# Patient Record
Sex: FEMALE | Race: WHITE | NOT HISPANIC OR LATINO | URBAN - METROPOLITAN AREA
[De-identification: names, ages, dates, MRNs, and addresses within clinical notes are randomized per-mention and may not be internally consistent; named-entity substitution may affect disease eponyms.]

---

## 2024-07-05 ENCOUNTER — OFFICE VISIT (OUTPATIENT)
Dept: OBGYN CLINIC | Facility: CLINIC | Age: 34
End: 2024-07-05
Payer: COMMERCIAL

## 2024-07-05 VITALS
BODY MASS INDEX: 23.74 KG/M2 | DIASTOLIC BLOOD PRESSURE: 74 MMHG | SYSTOLIC BLOOD PRESSURE: 130 MMHG | HEIGHT: 63 IN | WEIGHT: 134 LBS

## 2024-07-05 DIAGNOSIS — N63.13 BREAST LUMP ON RIGHT SIDE AT 8 O'CLOCK POSITION: Primary | ICD-10-CM

## 2024-07-05 PROCEDURE — 99203 OFFICE O/P NEW LOW 30 MIN: CPT | Performed by: STUDENT IN AN ORGANIZED HEALTH CARE EDUCATION/TRAINING PROGRAM

## 2024-07-05 RX ORDER — SACCHAROMYCES BOULARDII 250 MG
250 CAPSULE ORAL 2 TIMES DAILY
COMMUNITY

## 2024-07-05 NOTE — ASSESSMENT & PLAN NOTE
- Suspect benign process such as fibroadenoma. Given palpable lump, diagnostic imaging is recommended for further evaluation. Orders provided today.   - Return to office for routine care.

## 2024-07-05 NOTE — PROGRESS NOTES
[facility] [address], [address], [place] [address]    Assessment/Plan:  1. Breast lump on right side at 8 o'clock position  Assessment & Plan:  - Suspect benign process such as fibroadenoma. Given palpable lump, diagnostic imaging is recommended for further evaluation. Orders provided today.   - Return to office for routine care.   Orders:  -     Mammo diagnostic right w 3d & cad; Future  -     US breast right limited (diagnostic); Future; Expected date: 2024      Subjective:   Katelyn Marie Brunner is a 33 y.o.  presenting for evaluation of right breast lump. She is a new patient.   CC:   Chief Complaint   Patient presents with    Gynecology Problem     Lump right breast       HPI: Patient presents for evaluation of a right breast lump, which she noticed this past weekend. She reports associated right breast pain. Denies nipple discharge or dimpling.     ROS: Negative except as noted in HPI    The following portions of the patient's history were reviewed and updated as appropriate:   Past Medical History:   Diagnosis Date    Atrial tachycardia      History reviewed. No pertinent surgical history.  History reviewed. No pertinent family history.  Social History     Socioeconomic History    Marital status: /Civil Union     Spouse name: None    Number of children: None    Years of education: None    Highest education level: None   Occupational History    None   Tobacco Use    Smoking status: Never    Smokeless tobacco: Never   Substance and Sexual Activity    Alcohol use: Yes     Comment: social    Drug use: Never    Sexual activity: Yes     Partners: Male     Birth control/protection: Condom   Other Topics Concern    None   Social History Narrative    None     Social Determinants of Health     Financial Resource Strain: Not on file   Food Insecurity: Not on file   Transportation Needs: Not on file   Physical Activity: Not on file   Stress: Not on file   Social  "Connections: Not on file   Intimate Partner Violence: Not on file   Housing Stability: Not on file     Outpatient Medications Marked as Taking for the 7/5/24 encounter (Office Visit) with Vik Galvan MD   Medication    saccharomyces boulardii (FLORASTOR) 250 mg capsule     No Known Allergies        Objective:  /74 (BP Location: Left arm, Patient Position: Sitting, Cuff Size: Standard)   Ht 5' 3\" (1.6 m)   Wt 60.8 kg (134 lb)   LMP 06/14/2024   BMI 23.74 kg/m²        Chaperone present? Yes: Carol Coffman MA.    General Appearance: alert and oriented, in no acute distress.   Breast Exam:   Left: No dimpling, nipple retraction or discharge. No lumps or masses. No axillary or supraclavicular nodes.  Right: Approximately 6-7 mm firm, mobile lump at 8 o'clock position approximately 2 cm from the nipple. No dimpling, nipple retraction or discharge. No axillary or supraclavicular nodes.   Extremities: Normal range of motion.   Skin: normal, no rash or abnormalities  Neurologic: alert, oriented x3  Psychiatric: Appropriate affect, mood stable, cooperative with exam.        Vik Galvan MD  7/5/2024 1:22 PM  "

## 2024-08-01 ENCOUNTER — ANNUAL EXAM (OUTPATIENT)
Dept: OBGYN CLINIC | Facility: CLINIC | Age: 34
End: 2024-08-01
Payer: COMMERCIAL

## 2024-08-01 VITALS
WEIGHT: 133.6 LBS | SYSTOLIC BLOOD PRESSURE: 126 MMHG | BODY MASS INDEX: 23.67 KG/M2 | DIASTOLIC BLOOD PRESSURE: 76 MMHG | HEIGHT: 63 IN

## 2024-08-01 DIAGNOSIS — N63.13 BREAST LUMP ON RIGHT SIDE AT 8 O'CLOCK POSITION: ICD-10-CM

## 2024-08-01 DIAGNOSIS — Z01.411 ENCOUNTER FOR GYNECOLOGICAL EXAMINATION WITH ABNORMAL FINDING: Primary | ICD-10-CM

## 2024-08-01 DIAGNOSIS — Z12.4 SCREENING FOR CERVICAL CANCER: ICD-10-CM

## 2024-08-01 PROBLEM — Z01.419 ENCOUNTER FOR GYNECOLOGICAL EXAMINATION WITHOUT ABNORMAL FINDING: Status: ACTIVE | Noted: 2024-08-01

## 2024-08-01 PROCEDURE — 99395 PREV VISIT EST AGE 18-39: CPT | Performed by: OBSTETRICS & GYNECOLOGY

## 2024-08-01 NOTE — PROGRESS NOTES
[facility] - [address], [address], [place] [address]    ASSESSMENT/PLAN: Katelyn Marie Brunner is a 33 y.o.  who presents for annual gynecologic exam.    Encounter for routine gynecologic examination  - Routine well woman exam completed today.  - HPV Vaccination status: Immunization series complete  -mammogram  2024  calcifications for  bx    - STI screening offered including HIV testing: Declined  - Contraceptive counseling discussed.  Current contraception: condoms:     Additional problems addressed during this visit:  1. Encounter for gynecological examination with abnormal finding  2. Breast lump on right side at 8 o'clock position  Comments:  Pt w breast calcifications for sterotatic bx on     at  [facility].    extensive dw and review of pprocedure w  fu  TC consult w Dr Grove  3. Screening for cervical cancer  -     IGP, Aptima HPV, Rfx 16/18,45      CC:  Annual Gynecologic Examination    HPI: Katelyn Marie Brunner is a 33 y.o.  who presents for annual gynecologic examination.  34 yo here for wellness exam.    + has  biopsy  of  calcifications in palpable area of  breast scheduled at   for  the   .  Stereotatic.  + friend  in her  30's w  ductal breast cancer.  Mammogram  w dense tissue.    Would like to conceive  in the next  year.  Anxious .  + great midwife birth  at  [facility] 2 years ago.        The following portions of the patient's history were reviewed and updated as appropriate: She  has a past medical history of Atrial tachycardia.  She  has no past surgical history on file.  Her family history is not on file.  She  reports that she has never smoked. She has never been exposed to tobacco smoke. She has never used smokeless tobacco. She reports current alcohol use. She reports that she does not use drugs.  Current Outpatient Medications   Medication Sig Dispense Refill   • saccharomyces boulardii (FLORASTOR) 250 mg capsule Take 250 mg by mouth 2 (two) times  "a day       No current facility-administered medications for this visit.     She has No Known Allergies..    Review of Systems   Constitutional:  Negative for activity change, appetite change, chills and fever.   HENT:  Negative for ear pain and sore throat.    Eyes:  Negative for pain and visual disturbance.   Respiratory:  Negative for cough and shortness of breath.    Cardiovascular:  Negative for chest pain and palpitations.   Gastrointestinal:  Negative for abdominal pain and vomiting.   Endocrine: Negative.    Genitourinary: Negative.  Negative for dysuria and hematuria.        Breast lump   left breast   prior   breast fed son ,  calcifications for  bx    Musculoskeletal:  Negative for arthralgias and back pain.   Skin:  Negative for color change and rash.   Allergic/Immunologic: Negative.    Neurological:  Negative for seizures and syncope.   Hematological: Negative.    Psychiatric/Behavioral: Negative.     All other systems reviewed and are negative.        Objective:  /76 (BP Location: Left arm, Patient Position: Sitting, Cuff Size: Standard)   Ht 5' 3\" (1.6 m)   Wt 60.6 kg (133 lb 9.6 oz)   LMP 07/09/2024   BMI 23.67 kg/m²    Physical Exam  Vitals and nursing note reviewed.   Constitutional:       Appearance: Normal appearance.   HENT:      Head: Normocephalic.   Cardiovascular:      Rate and Rhythm: Normal rate and regular rhythm.      Pulses: Normal pulses.      Heart sounds: Normal heart sounds.   Pulmonary:      Effort: Pulmonary effort is normal.      Breath sounds: Normal breath sounds.   Chest:      Chest wall: No mass, lacerations, swelling, tenderness or edema.   Breasts:     Dominick Score is 4.      Breasts are symmetrical.      Right: Normal. No swelling, bleeding, inverted nipple, mass, nipple discharge, skin change or tenderness.      Left: No swelling, bleeding, inverted nipple, mass, nipple discharge, skin change or tenderness.          Comments: 1.   1 cm   cystic  mobile   non " tender   2.    < 1 cm  firm  mobile non tender   Abdominal:      General: Abdomen is flat. Bowel sounds are normal.      Palpations: Abdomen is soft.   Genitourinary:     General: Normal vulva.      Exam position: Lithotomy position.      Pubic Area: No rash.       Dominick stage (genital): 4.      Labia:         Right: No rash, tenderness or lesion.         Left: No rash, tenderness or lesion.       Urethra: No urethral pain, urethral swelling or urethral lesion.      Vagina: Normal.      Cervix: No cervical motion tenderness or discharge.      Uterus: Normal.       Adnexa: Right adnexa normal and left adnexa normal.      Rectum: Normal.   Musculoskeletal:         General: Normal range of motion.      Cervical back: Normal range of motion and neck supple.   Lymphadenopathy:      Upper Body:      Right upper body: No supraclavicular, axillary or pectoral adenopathy.      Left upper body: No supraclavicular, axillary or pectoral adenopathy.      Lower Body: No right inguinal adenopathy. No left inguinal adenopathy.   Skin:     General: Skin is warm and dry.   Neurological:      General: No focal deficit present.      Mental Status: She is alert and oriented to person, place, and time.   Psychiatric:         Mood and Affect: Mood normal.         Behavior: Behavior normal.         Thought Content: Thought content normal.         Judgment: Judgment normal.

## 2024-08-05 ENCOUNTER — TELEPHONE (OUTPATIENT)
Dept: OBGYN CLINIC | Facility: CLINIC | Age: 34
End: 2024-08-05

## 2024-08-05 NOTE — TELEPHONE ENCOUNTER
Called patient to discuss results of breast imaging completed on 7/31. Results were BiRads 4A. Discussed with patient. As noted in office notes from 8/1, she is scheduled for biopsy at [facility] on 8/7. I requested that patient send me a copy of her biopsy result via [employer], if possible. Otherwise, I will plan to follow up with her after biopsy. Patient expressed appreciation for call. All questions answered.     Vik Galvan MD  8/5/2024 4:09 PM

## 2024-08-07 LAB
CYTOLOGIST CVX/VAG CYTO: NORMAL
DX ICD CODE: NORMAL
HPV GENOTYPE REFLEX: NORMAL
HPV I/H RISK 4 DNA CVX QL PROBE+SIG AMP: NEGATIVE
OTHER STN SPEC: NORMAL
PATH REPORT.FINAL DX SPEC: NORMAL
SL AMB NOTE:: NORMAL
SL AMB SPECIMEN ADEQUACY: NORMAL
SL AMB TEST METHODOLOGY: NORMAL

## 2024-08-31 PROBLEM — Z12.4 SCREENING FOR CERVICAL CANCER: Status: RESOLVED | Noted: 2024-08-01 | Resolved: 2024-08-31

## 2024-08-31 PROBLEM — Z01.419 ENCOUNTER FOR GYNECOLOGICAL EXAMINATION WITHOUT ABNORMAL FINDING: Status: RESOLVED | Noted: 2024-08-01 | Resolved: 2024-08-31

## 2024-10-07 ENCOUNTER — NURSE TRIAGE (OUTPATIENT)
Age: 34
End: 2024-10-07

## 2024-10-07 NOTE — TELEPHONE ENCOUNTER
"Pt calling with concerns for foul, fishy odor after period as well during/after intercourse. Noted it the last few months. Has been trying to eat probiotic yogurt however has not really been helping.  Denies pain, abnormal discharge, fever, itching or vaginal irritation/burning. Pt would like to be seen by provider. RN scheduled appointment. Pt also following up on pap smear results. RN advised pap normal and no HPV detected. RN sent code to patient's e-mail to activate [employer]. No further questions.     Reason for Disposition  • Patient wants to be seen    Answer Assessment - Initial Assessment Questions  1. SYMPTOM: \"What's the main symptom you're concerned about?\" (e.g., pain, itching, dryness)      Fishy odor during/after intercourse and after period.   2. LOCATION: \"Where is the  s/s located?\" (e.g., inside/outside, left/right)      Vagina  3. ONSET: \"When did the  s/s  start?\"      A few months ago  4. PAIN: \"Is there any pain?\" If Yes, ask: \"How bad is it?\" (Scale: 1-10; mild, moderate, severe)      Denies  5. ITCHING: \"Is there any itching?\" If Yes, ask: \"How bad is it?\" (Scale: 1-10; mild, moderate, severe)      Denies  6. CAUSE: \"What do you think is causing the discharge?\" \"Have you had the same problem before? What happened then?\"      Possible BV  7. OTHER SYMPTOMS: \"Do you have any other symptoms?\" (e.g., fever, itching, vaginal bleeding, pain with urination, injury to genital area, vaginal foreign body)      Denies  8. PREGNANCY: \"Is there any chance you are pregnant?\" \"When was your last menstrual period?\"      TTC    Protocols used: Vaginal Symptoms-ADULT-[place]    "

## 2024-10-17 ENCOUNTER — OFFICE VISIT (OUTPATIENT)
Dept: OBGYN CLINIC | Facility: CLINIC | Age: 34
End: 2024-10-17
Payer: COMMERCIAL

## 2024-10-17 VITALS — HEIGHT: 63 IN | WEIGHT: 133 LBS | BODY MASS INDEX: 23.57 KG/M2

## 2024-10-17 DIAGNOSIS — N63.13 BREAST LUMP ON RIGHT SIDE AT 8 O'CLOCK POSITION: ICD-10-CM

## 2024-10-17 DIAGNOSIS — N92.0 MENORRHAGIA WITH REGULAR CYCLE: ICD-10-CM

## 2024-10-17 DIAGNOSIS — N76.0 ACUTE VAGINITIS: Primary | ICD-10-CM

## 2024-10-17 LAB
BV WHIFF TEST VAG QL: ABNORMAL
CLUE CELLS SPEC QL WET PREP: ABNORMAL
PH SMN: 5 [PH]
SL AMB POCT WET MOUNT: ABNORMAL
T VAGINALIS VAG QL WET PREP: ABNORMAL
YEAST VAG QL WET PREP: ABNORMAL

## 2024-10-17 PROCEDURE — 87210 SMEAR WET MOUNT SALINE/INK: CPT | Performed by: OBSTETRICS & GYNECOLOGY

## 2024-10-17 PROCEDURE — 99214 OFFICE O/P EST MOD 30 MIN: CPT | Performed by: OBSTETRICS & GYNECOLOGY

## 2024-10-17 RX ORDER — METRONIDAZOLE 7.5 MG/G
1 GEL VAGINAL DAILY
Qty: 5 G | Refills: 1 | Status: SHIPPED | OUTPATIENT
Start: 2024-10-17 | End: 2024-10-22

## 2024-10-17 NOTE — PATIENT INSTRUCTIONS
Open to air .  No underwear to bed a night.  Plain water to wash area.   External you can use  Aquaphor Healing ointment or  A+D ointment.  If you buy any over the counter medication make sure it is the  Monistat  7.  Limit your sugar intake.  Complete all medications !

## 2024-10-17 NOTE — PROGRESS NOTES
PROBLEM GYNECOLOGICAL VISIT    Katelyn Marie Brunner is a 33 y.o. female who presents today with complaint of  vaginal odor   mid cycle and w menses x 7 mo  .  Using otc w no relief.  Tenderness in  breast  cyst area.   Increases w  menses .  No breast dc.  Neg  breast  bx for calcifications.   + HMB  can  soak a tampon every   20 min  to one hour for past  7 mo.  + trying to conceive on PNV.   Her general medical history has been reviewed and she reports it as follows:    Past Medical History:   Diagnosis Date    Atrial tachycardia (HCC)      No past surgical history on file.  OB History          1    Para   1    Term   1            AB        Living   1         SAB        IAB        Ectopic        Multiple        Live Births   1               Social History     Tobacco Use    Smoking status: Never     Passive exposure: Never    Smokeless tobacco: Never   Substance Use Topics    Alcohol use: Yes     Comment: social    Drug use: Never       Current Outpatient Medications   Medication Instructions    saccharomyces boulardii (FLORASTOR) 250 mg, Oral, 2 times daily       History of Present Illness:   + vaginal odor w ovulation and menses for 7 mo. Using  otc w no relief.  Same partner  + stress of breast  lump.  + Cyst in  right breast is  tender  and  size can  increase and decrease.   No  breast dc, trauma to area fevers or chills.   Caffeine e in diet.   + hair loss and  heavy periods w cramps.  Thyroid is normal .      Review of Systems:  Review of Systems   Constitutional:  Positive for appetite change.   Gastrointestinal: Negative.    Endocrine: Negative.    Genitourinary: Negative.         Breast  tenderness    Skin: Negative.         Hair loss     Neurological: Negative.    Hematological: Negative.    Psychiatric/Behavioral: Negative.       Physical Exam:  There were no vitals taken for this visit.  Physical Exam  Constitutional:       Appearance: Normal appearance.   Genitourinary:      Bladder,  rectum and urethral meatus normal.      No lesions in the vagina.      Genitourinary Comments: 1 mobile less than 1 cm   no tenderness     2  no tenderness  density noted through out       Right Labia: No rash, tenderness, lesions or skin changes.     Left Labia: No tenderness, lesions, skin changes or rash.     No inguinal adenopathy present in the right or left side.     Pelvic Dominick Score: 5/5.     Vaginal discharge present.      No vaginal erythema, tenderness, bleeding or granulation tissue.      No vaginal prolapse present.     No vaginal atrophy present.       Right Adnexa: not tender, not full and no mass present.     Left Adnexa: not tender, not full and no mass present.     No cervical motion tenderness or friability.      No parametrium nodularity or thickening present.     Uterus is not enlarged or tender.      No urethral prolapse, tenderness, mass, hypermobility or discharge present.      Pelvic Floor: Levator muscle strength is 4/5.     Pelvic floor neuro is intact.     Pelvic exam was performed with patient in the lithotomy position.   Breasts:     Right: No inverted nipple, mass, nipple discharge, skin change or tenderness.      Left: Inverted nipple present.   Chest:       Abdominal:      General: Bowel sounds are normal.      Palpations: Abdomen is soft.      Hernia: There is no hernia in the left inguinal area or right inguinal area.   Lymphadenopathy:      Upper Body:      Right upper body: No supraclavicular or axillary adenopathy.      Left upper body: No axillary adenopathy.      Lower Body: No right inguinal adenopathy. No left inguinal adenopathy.   Vitals and nursing note reviewed.     Point of Care Testing:   -Wet mount: neg    -KOH mount: +    -Whiff: Positive     -urine pregnancy test:  na    -urinalysis: na    Assessment:  Plan    1. BV  Open to air no under  wear to bed at night.  Decrease  stress   one swab sent  Pt  trying to conceive.  Metorgel vaginal one adele at  bed time for  5  nights disp one w one refill.     2.  HMB  pt to start  motrin 600 mg every 6 hours while awake  w onset of menses.  Will assist in decreasing flow and  cramps.  TV US day  5-10 to ro fibroids and assess lining   3. Preconception  no genetic disorders . Continue pnv w folic acid  1 gm  daily pt  and  no genetic disorders.   4.  Mastodynia  hx of breast cyst hx of benign biopsy . Decrease caffeine intake  Good supportive  bra.  Monitor size and if persists  after menses . TC  consult w  Dr Grove    I have spent a total time of 35 minutes in caring for this patient on the day of the visit/encounter including Diagnostic results, Risks and benefits of tx options, Instructions for management, Patient and family education, Importance of tx compliance, Risk factor reductions, Counseling / Coordination of care, Documenting in the medical record, Reviewing / ordering tests, medicine, procedures  , and Obtaining or reviewing history  .     Reviewed with patient that test results are available in MyChart immediately, but that they will not necessarily be reviewed by me immediately.  Explained that I will review results at my earliest opportunity and contact patient appropriately.

## 2024-10-18 LAB — BV BACTERIA RRNA VAG QL NAA+PROBE: NEGATIVE

## 2024-11-26 ENCOUNTER — NURSE TRIAGE (OUTPATIENT)
Age: 34
End: 2024-11-26

## 2024-11-26 DIAGNOSIS — O26.859 SPOTTING IN EARLY PREGNANCY: Primary | ICD-10-CM

## 2024-11-26 NOTE — TELEPHONE ENCOUNTER
Regarding: pt found out pregant 2 days ago and might be miscarrying  ----- Message from Maureen CORMIER sent at 11/26/2024  8:39 AM EST -----  Patient called and she found out 2 days ago she is pregnant and now might be miscarrying she has dull ache and a little blood when wiped and took pregnancy tests and now are negative.  Please call her back at [phone]. Thank you

## 2024-11-26 NOTE — TELEPHONE ENCOUNTER
"Patient is calling in. She states she had several positive pregnancy tests on Saturday. LMP 10/27, which would make her 4w2d. Her periods are regular every month. She rechecked pregnancy tests yesterday and the digital tests came back negative. Last night while wiping she noticed two small red spots of blood. She had no further bleeding after this. She reports a dull ache but denies cramping or pain. Discussed will follow up with provider for recommendations. Discussed if severe bleeding or pain she is to report to the ED.     Reason for Disposition  • SPOTTING (single or brief episode)    Answer Assessment - Initial Assessment Questions  1. ONSET: \"When did this bleeding start?\"        + test Saturday, Monday had 2 digital tests that were negative and this AM had a negative test as well.   2. BLEEDING SEVERITY: \"Describe the bleeding that you are having.\" \"How much bleeding is there?\"       2 spot when wiping last night that was bright red  3. ABDOMEN PAIN: \"Do you have any pain?\" \"How bad is the pain?\"  (e.g., Scale 0-10; none, mild, moderate, or severe)      Dull ache   4. PREGNANCY: \"Do you know how many weeks or months pregnant you are?\" \"When was the first day of your last normal menstrual period?\"      LMP-10/27 (periods are spot on) today would be day 3 of cycle if would have gotten in    Protocols used: Pregnancy - Vaginal Bleeding Less Than 20 Weeks EGA-Adult-[place]    "

## 2024-11-26 NOTE — TELEPHONE ENCOUNTER
Please have her get T&S and serum quantitative hCG today. I have placed orders. Agree with additional recommendations as provided.     Vik Galvan MD  11/26/2024 10:33 AM

## 2024-11-27 ENCOUNTER — TELEPHONE (OUTPATIENT)
Age: 34
End: 2024-11-27

## 2024-11-27 ENCOUNTER — HOSPITAL ENCOUNTER (OUTPATIENT)
Dept: ULTRASOUND IMAGING | Facility: HOSPITAL | Age: 34
Discharge: HOME | End: 2024-11-27
Payer: COMMERCIAL

## 2024-11-27 ENCOUNTER — RESULTS FOLLOW-UP (OUTPATIENT)
Dept: OBGYN CLINIC | Facility: CLINIC | Age: 34
End: 2024-11-27

## 2024-11-27 DIAGNOSIS — N76.0 ACUTE VAGINITIS: ICD-10-CM

## 2024-11-27 DIAGNOSIS — N92.0 MENORRHAGIA WITH REGULAR CYCLE: ICD-10-CM

## 2024-11-27 LAB
ABO GROUP BLD: NORMAL
B-HCG SERPL-ACNC: <5 MIU/ML
BLD GP AB SCN SERPL QL: NORMAL
RH BLD: NORMAL

## 2024-11-27 PROCEDURE — 76830 TRANSVAGINAL US NON-OB: CPT

## 2024-11-27 PROCEDURE — 76856 US EXAM PELVIC COMPLETE: CPT

## 2024-11-27 NOTE — TELEPHONE ENCOUNTER
Yes, the 4th day of her period is fine as long as she is past her days of heaviest flow.     Vik Galvan MD  11/27/2024 10:58 AM

## 2024-11-27 NOTE — TELEPHONE ENCOUNTER
Patient calling in stating that she has a pending US to be completed, pt stating that it would have to be the 4th day of her period. Pt stating that she wasn't able to go last cycle. Pt stating that she had a miscarriage from a chemical pregnancy, and pt is currently having vaginal bleeding. Pt is asking if she's able to get the US to see if there are any cysts or anything else that may be going on.

## 2024-12-03 ENCOUNTER — RESULTS FOLLOW-UP (OUTPATIENT)
Dept: OBGYN CLINIC | Facility: CLINIC | Age: 34
End: 2024-12-03

## 2025-08-08 ENCOUNTER — TELEPHONE (OUTPATIENT)
Age: 35
End: 2025-08-08